# Patient Record
Sex: MALE | Race: WHITE
[De-identification: names, ages, dates, MRNs, and addresses within clinical notes are randomized per-mention and may not be internally consistent; named-entity substitution may affect disease eponyms.]

---

## 2018-01-09 ENCOUNTER — HOSPITAL ENCOUNTER (EMERGENCY)
Dept: HOSPITAL 56 - MW.ED | Age: 33
Discharge: HOME | End: 2018-01-09
Payer: COMMERCIAL

## 2018-01-09 VITALS — SYSTOLIC BLOOD PRESSURE: 136 MMHG | DIASTOLIC BLOOD PRESSURE: 73 MMHG

## 2018-01-09 DIAGNOSIS — J06.9: Primary | ICD-10-CM

## 2018-01-09 DIAGNOSIS — Z88.5: ICD-10-CM

## 2018-06-20 ENCOUNTER — HOSPITAL ENCOUNTER (EMERGENCY)
Dept: HOSPITAL 56 - MW.ED | Age: 33
LOS: 1 days | Discharge: HOME | End: 2018-06-21
Payer: COMMERCIAL

## 2018-06-20 DIAGNOSIS — S93.325A: ICD-10-CM
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
## 2018-06-20 DIAGNOSIS — Z88.5: ICD-10-CM

## 2018-06-20 DIAGNOSIS — S92.232A: Primary | ICD-10-CM

## 2018-06-20 DIAGNOSIS — Z88.8: ICD-10-CM

## 2018-06-20 PROCEDURE — 73630 X-RAY EXAM OF FOOT: CPT

## 2018-06-20 PROCEDURE — 96372 THER/PROPH/DIAG INJ SC/IM: CPT

## 2018-06-20 PROCEDURE — 99284 EMERGENCY DEPT VISIT MOD MDM: CPT

## 2018-06-20 PROCEDURE — 73700 CT LOWER EXTREMITY W/O DYE: CPT

## 2018-06-20 PROCEDURE — 29515 APPLICATION SHORT LEG SPLINT: CPT

## 2018-06-20 NOTE — EDM.PDOC
ED HPI GENERAL MEDICAL PROBLEM





- General


Chief Complaint: Lower Extremity Injury/Pain


Stated Complaint: LEFT FOOT PAIN


Time Seen by Provider: 06/20/18 21:56


Source of Information: Reports: Patient





- History of Present Illness


INITIAL COMMENTS - FREE TEXT/NARRATIVE: 





HISTORY AND PHYSICAL:





History of present illness:


32-year-old male presenting emergent department with chief complaint of left 

ankle pain after playing softball this afternoon.





Patient states he was running around second base when he planted his his left 

foot rolling it in eversion type of fashion. States that he heard a loud pop 

and had immediate pain. Patient was unable to place any weight on the extremity 

after. He came to emergency department immediately for further evaluation and 

noted significant swelling to the anterior surface of the ankle. Patient states 

that he has significant pain to the anterior surface. He denies any decrease in 

sensation or muscle strength. He has had significant decrease in range of 

motion secondary to pain and swelling. Denies any history of previous injury to 

ankle. He is allergic to acetaminophen and oxycodone. Otherwise generally 

healthy.





On exam significant swelling to the dorsal aspect of the foot significant 

swelling to the dorsal aspect of the foot. Positive pronation-abduction as well 

as tarsal metatarsal squeeze. Neurovascular intact. No signs of compartment 

syndrome.





Initial x-ray of the left foot showed nonspecific ossific density overlying the 

Lisfranc joint seen only on AP view. There is associated surrounding soft 

tissue swelling. Secondary to the mechanism of injury and his point tenderness 

we did do an unenhanced CT of the left foot.





Preliminary CT results showed:


1. Mildly comminuted fracture seen along the plantar aspect of the middle 

cuneiform on image 334, series 201.


2. A small corticated ossicle between the base of the first and second 

metatarsals. An adjacent ill-defined calcific density seen in this region on 

image 381, series 201. Injury of the Lisfranc ligament with a small avulsion 

fracture cannot be excluded.


3. Moderate dorsal soft tissue swelling the midfoot noted.





Did call Dr. Polk, Orthopedic surgeon, advised of patient. She recommended 

posterior splint as well as nonweight bearing and to have patient call her in 

a.m. to set up an appointment as soon as possible.








Review of systems: 


As per history of present illness and below otherwise all systems reviewed and 

negative.





Past medical history: 


As per history of present illness and as reviewed below otherwise 

noncontributory.





Surgical history: 


As per history of present illness and as reviewed below otherwise 

noncontributory.





Social history: 


No reported history of drug or alcohol abuse.





Family history: 


As per history of present illness and as reviewed below otherwise 

noncontributory.





Physical exam:


HEENT: Atraumatic, normocephalic, pupils reactive, negative for conjunctival 

pallor or scleral icterus, mucous membranes moist, throat clear, neck supple, 

nontender, trachea midline.


Lungs: Clear to auscultation, breath sounds equal bilaterally, chest nontender.


Heart: S1S2, regular, negative for clicks, rubs, or JVD.


Abdomen: Soft, nondistended, nontender. Negative for masses or 

hepatosplenomegaly. Negative for costovertebral tenderness.


Pelvis: Stable nontender.


Genitourinary: Deferred.


Rectal: Deferred.


Extremities: See above H&P There is significant swelling to the anterior 

surface of the left foot. Achilles is intact. Neurovascular intact. Increased 

pain with flexion and eversion. No decrease in strength however limited 

secondary to pain. Atraumatic, negative for cords or calf pain. Neurovascular 

unremarkable.


Neuro: Awake, alert, oriented. Cranial nerves II through XII unremarkable. 

Cerebellum unremarkable. Motor and sensory unremarkable throughout. Exam 

nonfocal.





Diagnostics:


Left foot x-ray, CT left foot





Therapeutics:


Toradol 60 mg IM 1


Posterior splint with crutches


Nonweightbearing





Impression: 


Comminuted fracture of middle cuneiform


Suspect possible Lisfranc fracture





Plan:


See above H&P. Patient instructed to follow-up with Dr. Villalobos, orthopedic 

surgeon. Posterior splint placed and patient instructed to be nonweightbearing. 

Patient states that he is okay with ibuprofen for pain control. Instructed to 

rest the area, keep posterior splint in place, elevate extremity to decrease 

swelling and inflammation, and use ice 15 minutes on 15 minutes off.





Treatments PTA: Reports: Cold Therapy


  ** left foot


Pain Score (Numeric/FACES): 5





- Related Data


 Allergies











Allergy/AdvReac Type Severity Reaction Status Date / Time


 


acetaminophen [From Percocet] Allergy  Rash Verified 06/20/18 21:29


 


oxycodone HCl [From Percocet] Allergy  Rash Verified 06/20/18 21:29











Home Meds: 


 Home Meds





Fluticasone/Vilanterol [Breo Ellipta 200-25 Mcg INH] 1 puff IH ASDIRECTED PRN 06 /20/18 [History]











Past Medical History





- Past Health History


Medical/Surgical History: Denies Medical/Surgical History


Respiratory History: Reports: Asthma





- Past Surgical History


GI Surgical History: Reports: Hernia, Abdominal





Social & Family History





- Family History


Family Medical History: Noncontributory





- Tobacco Use


Smoking Status *Q: Never Smoker





- Recreational Drug Use


Recreational Drug Use: No





Review of Systems





- Review of Systems


Review Of Systems: ROS reveals no pertinent complaints other than HPI.





ED EXAM, GENERAL





- Physical Exam


Exam: See Below





Course





- Vital Signs


Last Recorded V/S: 


 Last Vital Signs











Temp  97.7 F   06/20/18 21:14


 


Pulse  109 H  06/20/18 21:14


 


Resp  16   06/20/18 21:14


 


BP  159/100 H  06/20/18 21:14


 


Pulse Ox  95   06/20/18 21:14














- Orders/Labs/Meds


Orders: 


 Active Orders 24 hr











 Category Date Time Status


 


 Foot Comp Min 3V Lt [CR] Stat Exams  06/20/18 22:05 Taken


 


 Foot w wo Cont Lt [CT] Stat Exams  06/20/18 22:41 Stop Req


 


 Foot wo Cont Lt [CT] Stat Exams  06/20/18 22:46 Taken











Meds: 


Medications














Discontinued Medications














Generic Name Dose Route Start Last Admin





  Trade Name Freq  PRN Reason Stop Dose Admin


 


Ketorolac Tromethamine  60 mg  06/20/18 22:06  06/20/18 22:16





  Toradol  IM  06/20/18 22:07  60 mg





  ONETIME ONE   Administration





     





     





     





     


 


Ketorolac Tromethamine  Confirm  06/20/18 22:18  06/20/18 22:27





  Toradol  Administered  06/20/18 22:19  Not Given





  Dose   





  60 mg   





  .ROUTE   





  .STK-MED ONE   





     





     





     





     














Departure





- Departure


Time of Disposition: 00:47


Disposition: Home, Self-Care 01


Condition: Good


Clinical Impression: 


 Lisfranc fracture, Cuneiform fracture, foot








- Discharge Information


Referrals: 


Brayan Armstrong MD [Primary Care Provider] - 


Forms:  ED Department Discharge


Additional Instructions: 


My general discharge


The following information is given to patients seen in the emergency department 

who are being discharged to home. This information is to outline your options 

for follow-up care. We provide all patients seen in our emergency department 

with a follow-up referral.





The need for follow-up, as well as the timing and circumstances, are variable 

depending upon the specifics of your emergency department visit.





If you don't have a primary care physician on staff, we will provide you with a 

referral. We always advise you to contact your personal physician following an 

emergency department visit to inform them of the circumstance of the visit and 

for follow-up with them and/or the need for any referrals to a consulting 

specialist.





The emergency department will also refer you to a specialist when appropriate. 

This referral assures that you have the opportunity for follow-up care with a 

specialist. All of these measure are taken in an effort to provide you with 

optimal care, which includes your follow-up.





Under all circumstances we always encourage you to contact your private 

physician who remains a resource for coordinating your care. When calling for 

follow-up care, please make the office aware that this follow-up is from your 

recent emergency room visit. If for any reason you are refused follow-up, 

please contact the [facility] Emergency 

Department at [phone] and asked to speak to the emergency department 

charge nurse.





[facility]


Specialty Care - Orthopedic Clinic


[address], [address]


[place] [address]


Phone: [phone]


Fax: [phone]





1. Follow-up with Dr. Villalobos. Number provided above. Please call today for an 

appointment.


2. Use rest, ice, elevation and posterior splint as we discussed. May use 

ibuprofen for pain and inflammation.


3. Return to emergency department if any new or worsening symptoms specifically 

decreased sensation and blood flow as discussed.





- My Orders


Last 24 Hours: 


My Active Orders





06/20/18 22:05


Foot Comp Min 3V Lt [CR] Stat 





06/20/18 22:41


Foot w wo Cont Lt [CT] Stat 





06/20/18 22:46


Foot wo Cont Lt [CT] Stat 














- Assessment/Plan


Last 24 Hours: 


My Active Orders





06/20/18 22:05


Foot Comp Min 3V Lt [CR] Stat 





06/20/18 22:41


Foot w wo Cont Lt [CT] Stat 





06/20/18 22:46


Foot wo Cont Lt [CT] Stat

## 2018-06-21 VITALS — SYSTOLIC BLOOD PRESSURE: 149 MMHG | DIASTOLIC BLOOD PRESSURE: 100 MMHG

## 2018-06-21 NOTE — CR
EXAM DATE: 18



PATIENT'S AGE: 32





Patient: WILLIAM URANKER



Facility: [place], [place]

Patient ID: [id number]

Site Patient ID: [id number].

Site Accession #: [id number]

: 1985

Study: XRay Extremity Left foot [id number]/20/2018 10:18:02 PM

Ordering Physician: Delonte Donnelly



Final Report: 

INDICATION: sports injury



TECHNIQUE:

Three views of the left foot



COMPARISON:

None 



FINDINGS:

Bones: Nonspecific ossific density overlying the Lisfranc joint seen only on 
the AP view. There is associated surrounding soft tissue swelling. 



Joint spaces: Degenerative changes 



IMPRESSION:

Nonspecific ossific density overlying the Lisfranc joint seen only on the AP 
view. There is associated surrounding soft tissue swelling. Please correlate 
with mechanism of injury and point tenderness. If clinically warranted 
unenhanced CT of the left foot could be considered.



Dictated by Magno Gamez MD @ 2018 10:26:53 PM







Dictated by: Magno Gamez MD @ 2018 22:27:01

(Electronic Signature)



Report Signed by Proxy.
BEN

## 2018-06-21 NOTE — CT
EXAM DATE: 18



PATIENT'S AGE: 32





Patient: WILLIAM URANKER



Facility: [place], [place]

Patient ID: [id number]

: 1985

Study: CT Extremity Left [id number]/20/2018 11:20:16 PM

Ordering Physician: Delonte Donnelly



Final Report: 

HISTORY:

Left foot injury.



TECHNIQUE:

Noncontrast CT of the left ankle and foot.



COMPARISON:

Radiographs 2018.



FINDINGS:

There is a small acute fracture involving the plantar aspect of the 2nd 
cuneiform bone which demonstrates up to approximately 4 mm of displacement. 
Fracture is well seen on image #341 of series 201. Small adjacent linear 
avulsion fracture involving the lateral aspect of the 1st cuneiform bone on 
image #326 of series 201. The ossicle along the dorsal lateral aspect of the 
1st TMT articulation appears chronic and corticated. There is no significant 
TMT joint malalignment. No significant widening of the interval between the 2nd 
metatarsal base and 1st cuneiform bone.

-

No acute distal tibial or fibular fracture. Ankle mortise appears maintained. 
Subtalar articulations are maintained. Spurring at the Achilles attachment to 
the posterior calcaneus. Type 2 navicular. Mild arthrosis of the navicular-1st 
cuneiform articulation. Dorsal soft tissue swelling and hemorrhage.



IMPRESSION:

1. Small acute mildly displaced fracture involving the plantar 2nd cuneiform 
bone.

2. Tiny linear avulsion fracture involving the 1st cuneiform bone along its 
lateral aspect.

3. No TMT joint malalignment.

4. Type 2 navicular. 

5. Dorsal soft tissue swelling and hemorrhage.



Please note that all CT scans at this facility use dose modulation, iterative 
reconstruction, and/or weight-based dosing when appropriate to reduce radiation 
dose to as low as reasonably achievable.



Dictated by Edwardo Quick MD @ 2018 6:31AM

(Electronic Signature)







Report Signed by Proxy.
BEN

## 2019-09-27 NOTE — EDM.PDOC
ED HPI GENERAL MEDICAL PROBLEM





- General


Chief Complaint: General


Stated Complaint: CHEST PAIN


Time Seen by Provider: 09/27/19 07:20


Source of Information: Reports: Patient





- History of Present Illness


INITIAL COMMENTS - FREE TEXT/NARRATIVE: 





HISTORY AND PHYSICAL:


History of present illness:


[Patient with history of anxiety presents with palpitation, was up this morning 

lifting weights he does have a watch monitor on that was relieving her rate to 

209 however he has no symptoms such as fever nausea vomiting diarrhea 

constipation chest pain shortness breath headache dizziness palpitation no 

bowel or urine symptoms





Generally healthy no chronic illness or disease denies medications or 

weightlifting supplements


]


Review of systems: 


As per history of present illness and below otherwise all systems reviewed and 

negative.


Past medical history: 


As per history of present illness and as reviewed below otherwise 

noncontributory.


Surgical history: 


As per history of present illness and as reviewed below otherwise 

noncontributory.


Social history: 


No reported history of drug or alcohol abuse.


Family history: 


As per history of present illness and as reviewed below otherwise 

noncontributory.





Physical exam:


HEENT: Atraumatic, normocephalic, pupils reactive, negative for conjunctival 

pallor or scleral icterus, mucous membranes moist, throat clear, neck supple, 

nontender, trachea midline.


Lungs: Clear to auscultation, breath sounds equal bilaterally, chest nontender.


Heart: S1S2, regular, negative for clicks, rubs, or JVD.


Abdomen: Soft, nondistended, nontender. Negative for masses or 

hepatosplenomegaly. Negative for costovertebral tenderness.


Pelvis: Stable nontender.


Genitourinary: Deferred.


Rectal: Deferred.


Extremities: Atraumatic, negative for cords or calf pain. Neurovascular 

unremarkable.


Neuro: Awake, alert, oriented. Cranial nerves II through XII unremarkable. 

Cerebellum unremarkable. Motor and sensory unremarkable throughout. Exam 

nonfocal.





Diagnostics:


[CBC CMP UA troponin


EKG


Chest 1 view


]


Therapeutics:


Normal saline


]


Impression: 


Medical screening exam


Anxiety about health


 heart rate has remained normal during his stay while on cardiac monitor 





Definitive disposition and diagnosis as appropriate pending reevaluation and 

review of above.





- Related Data


 Allergies











Allergy/AdvReac Type Severity Reaction Status Date / Time


 


acetaminophen [From Percocet] Allergy  Rash Verified 09/27/19 07:14


 


oxycodone HCl [From Percocet] Allergy  Rash Verified 09/27/19 07:14











Home Meds: 


 Home Meds





Albuterol [Ventolin HFA] 8 gm INH ASDIRECTED 09/27/19 [History]











Past Medical History





- Past Health History


Medical/Surgical History: Denies Medical/Surgical History


Respiratory History: Reports: Asthma


Gastrointestinal History: Reports: Celiac Disease





- Infectious Disease History


Infectious Disease History: Reports: None





- Past Surgical History


GI Surgical History: Reports: Hernia, Abdominal





Social & Family History





- Family History


Family Medical History: Noncontributory





- Tobacco Use


Smoking Status *Q: Never Smoker





- Caffeine Use


Caffeine Use: Reports: None





- Recreational Drug Use


Recreational Drug Use: No





ED ROS GENERAL





- Review of Systems


Review Of Systems: See Below





ED EXAM, GENERAL





- Physical Exam


Exam: See Below





Course





- Vital Signs


Last Recorded V/S: 


 Last Vital Signs











Temp  97.3 F   09/27/19 07:15


 


Pulse  88   09/27/19 08:00


 


Resp  16   09/27/19 08:00


 


BP  136/80   09/27/19 08:00


 


Pulse Ox  94 L  09/27/19 08:00














- Orders/Labs/Meds


Orders: 


 Active Orders 24 hr











 Category Date Time Status


 


 EKG Documentation Completion [RC] STAT Care  09/27/19 07:21 Active











Labs: 


 Laboratory Tests











  09/27/19 09/27/19 09/27/19 Range/Units





  07:30 07:30 07:30 


 


WBC  6.33    (4.0-11.0)  K/uL


 


RBC  5.23    (4.50-5.90)  M/uL


 


Hgb  15.2    (13.0-17.0)  g/dL


 


Hct  44.6    (38.0-50.0)  %


 


MCV  85.3    (80.0-98.0)  fL


 


MCH  29.1    (27.0-32.0)  pg


 


MCHC  34.1    (31.0-37.0)  g/dL


 


RDW Std Deviation  39.6    (28.0-62.0)  fl


 


RDW Coeff of Oscar  13    (11.0-15.0)  %


 


Plt Count  208    (150-400)  K/uL


 


MPV  10.60    (7.40-12.00)  fL


 


Neut % (Auto)  53.1    (48.0-80.0)  %


 


Lymph % (Auto)  35.5    (16.0-40.0)  %


 


Mono % (Auto)  8.2    (0.0-15.0)  %


 


Eos % (Auto)  2.7    (0.0-7.0)  %


 


Baso % (Auto)  0.5    (0.0-1.5)  %


 


Neut # (Auto)  3.4    (1.4-5.7)  K/uL


 


Lymph # (Auto)  2.3    (0.6-2.4)  K/uL


 


Mono # (Auto)  0.5    (0.0-0.8)  K/uL


 


Eos # (Auto)  0.2    (0.0-0.7)  K/uL


 


Baso # (Auto)  0.0    (0.0-0.1)  K/uL


 


Nucleated RBC %  0.0    /100WBC


 


Nucleated RBCs #  0    K/uL


 


INR   1.03   


 


Sodium    139  (136-148)  mmol/L


 


Potassium    3.9  (3.5-5.1)  mmol/L


 


Chloride    105  ()  mmol/L


 


Carbon Dioxide    20.4 L  (21.0-32.0)  mmol/L


 


BUN    17  (7.0-18.0)  mg/dL


 


Creatinine    1.3  (0.8-1.3)  mg/dL


 


Est Cr Clr Drug Dosing    91.34  mL/min


 


Estimated GFR (MDRD)    > 60.0  ml/min


 


Glucose    112 H  ()  mg/dL


 


Calcium    8.8  (8.5-10.1)  mg/dL


 


Total Bilirubin    0.4  (0.2-1.0)  mg/dL


 


AST    40 H  (15-37)  IU/L


 


ALT    71 H  (14-63)  IU/L


 


Alkaline Phosphatase    89  ()  U/L


 


Troponin I    < 0.050  (0.000-0.056)  ng/mL


 


Total Protein    7.4  (6.4-8.2)  g/dL


 


Albumin    3.8  (3.4-5.0)  g/dL


 


Globulin    3.6  (2.6-4.0)  g/dL


 


Albumin/Globulin Ratio    1.1  (0.9-1.6)  


 


Urine Color     


 


Urine Appearance     


 


Urine pH     (5.0-8.0)  


 


Ur Specific Gravity     (1.001-1.035)  


 


Urine Protein     (NEGATIVE)  mg/dL


 


Urine Glucose (UA)     (NEGATIVE)  mg/dL


 


Urine Ketones     (NEGATIVE)  mg/dL


 


Urine Occult Blood     (NEGATIVE)  


 


Urine Nitrite     (NEGATIVE)  


 


Urine Bilirubin     (NEGATIVE)  


 


Urine Urobilinogen     (<2.0)  EU/dL


 


Ur Leukocyte Esterase     (NEGATIVE)  


 


Urine RBC     (0-2/HPF)  


 


Urine WBC     (0-5/HPF)  


 


Ur Epithelial Cells     (NONE-FEW)  


 


Urine Bacteria     (NEGATIVE)  














  09/27/19 Range/Units





  07:53 


 


WBC   (4.0-11.0)  K/uL


 


RBC   (4.50-5.90)  M/uL


 


Hgb   (13.0-17.0)  g/dL


 


Hct   (38.0-50.0)  %


 


MCV   (80.0-98.0)  fL


 


MCH   (27.0-32.0)  pg


 


MCHC   (31.0-37.0)  g/dL


 


RDW Std Deviation   (28.0-62.0)  fl


 


RDW Coeff of Oscar   (11.0-15.0)  %


 


Plt Count   (150-400)  K/uL


 


MPV   (7.40-12.00)  fL


 


Neut % (Auto)   (48.0-80.0)  %


 


Lymph % (Auto)   (16.0-40.0)  %


 


Mono % (Auto)   (0.0-15.0)  %


 


Eos % (Auto)   (0.0-7.0)  %


 


Baso % (Auto)   (0.0-1.5)  %


 


Neut # (Auto)   (1.4-5.7)  K/uL


 


Lymph # (Auto)   (0.6-2.4)  K/uL


 


Mono # (Auto)   (0.0-0.8)  K/uL


 


Eos # (Auto)   (0.0-0.7)  K/uL


 


Baso # (Auto)   (0.0-0.1)  K/uL


 


Nucleated RBC %   /100WBC


 


Nucleated RBCs #   K/uL


 


INR   


 


Sodium   (136-148)  mmol/L


 


Potassium   (3.5-5.1)  mmol/L


 


Chloride   ()  mmol/L


 


Carbon Dioxide   (21.0-32.0)  mmol/L


 


BUN   (7.0-18.0)  mg/dL


 


Creatinine   (0.8-1.3)  mg/dL


 


Est Cr Clr Drug Dosing   mL/min


 


Estimated GFR (MDRD)   ml/min


 


Glucose   ()  mg/dL


 


Calcium   (8.5-10.1)  mg/dL


 


Total Bilirubin   (0.2-1.0)  mg/dL


 


AST   (15-37)  IU/L


 


ALT   (14-63)  IU/L


 


Alkaline Phosphatase   ()  U/L


 


Troponin I   (0.000-0.056)  ng/mL


 


Total Protein   (6.4-8.2)  g/dL


 


Albumin   (3.4-5.0)  g/dL


 


Globulin   (2.6-4.0)  g/dL


 


Albumin/Globulin Ratio   (0.9-1.6)  


 


Urine Color  YELLOW  


 


Urine Appearance  CLEAR  


 


Urine pH  5.5  (5.0-8.0)  


 


Ur Specific Gravity  1.010  (1.001-1.035)  


 


Urine Protein  NEGATIVE  (NEGATIVE)  mg/dL


 


Urine Glucose (UA)  NEGATIVE  (NEGATIVE)  mg/dL


 


Urine Ketones  NEGATIVE  (NEGATIVE)  mg/dL


 


Urine Occult Blood  TRACE-LYSED H  (NEGATIVE)  


 


Urine Nitrite  NEGATIVE  (NEGATIVE)  


 


Urine Bilirubin  NEGATIVE  (NEGATIVE)  


 


Urine Urobilinogen  0.2  (<2.0)  EU/dL


 


Ur Leukocyte Esterase  NEGATIVE  (NEGATIVE)  


 


Urine RBC  0-1  (0-2/HPF)  


 


Urine WBC  0-1  (0-5/HPF)  


 


Ur Epithelial Cells  OCCASIONAL  (NONE-FEW)  


 


Urine Bacteria  RARE  (NEGATIVE)  











Meds: 


Medications














Discontinued Medications














Generic Name Dose Route Start Last Admin





  Trade Name Freq  PRN Reason Stop Dose Admin


 


Sodium Chloride  1,000 mls @ 999 mls/hr  09/27/19 07:20  09/27/19 07:33





  Normal Saline  IV  09/27/19 08:20  999 mls/hr





  STAT ONE   Administration





     





     





     





     














Departure





- Departure


Time of Disposition: 08:30


Disposition: Home, Self-Care 01


Condition: Good


Clinical Impression: 


 Encounter for medical screening examination








- Discharge Information


Referrals: 


PCP,None [Primary Care Provider] - 


Forms:  ED Department Discharge


Additional Instructions: 


The following information is given to patients seen in the emergency department 

who are being discharged to home. This information is to outline your options 

for follow-up care. We provide all patients seen in our emergency department 

with a follow-up referral.





The need for follow-up, as well as the timing and circumstances, are variable 

depending upon the specifics of your emergency department visit.





If you don't have a primary care physician on staff, we will provide you with a 

referral. We always advise you to contact your personal physician following an 

emergency department visit to inform them of the circumstance of the visit and 

for follow-up with them and/or the need for any referrals to a consulting 

specialist.





The emergency department will also refer you to a specialist when appropriate. 

This referral assures that you have the opportunity for follow-up care with a 

specialist. All of these measure are taken in an effort to provide you with 

optimal care, which includes your follow-up.





Under all circumstances we always encourage you to contact your private 

physician who remains a resource for coordinating your care. When calling for 

follow-up care, please make the office aware that this follow-up is from your 

recent emergency room visit. If for any reason you are refused follow-up, 

please contact the [facility] emergency department at [phone] 

and asked to speak to the emergency department charge nurse.








- My Orders


Last 24 Hours: 


My Active Orders





09/27/19 07:21


EKG Documentation Completion [RC] STAT 














- Assessment/Plan


Last 24 Hours: 


My Active Orders





09/27/19 07:21


EKG Documentation Completion [RC] STAT

## 2019-09-27 NOTE — CR
HISTORY:



Chest pain 



COMPARISON:



10/30/2013 



FINDINGS:



A portable erect AP view of the chest was obtained at 0751 hours. 



The lungs remain clear. No focal or diffuse infiltrates are present. 



The heart remains normal in size. 



The mediastinum is normal in appearance. 



The osseous structures are normal in appearance for the patient`s age. 



IMPRESSION:



Normal portable chest single view.



Dictated by Kei Fabian MD @ Sep 27 2019  8:05AM



Signed by Dr. Kei Fabian @ Sep 27 2019  8:06AM
